# Patient Record
(demographics unavailable — no encounter records)

---

## 2017-04-08 NOTE — ERPHSYRPT
- History of Present Illness


Time Seen by Provider: 04/08/17 00:42


Source: family


Exam Limitations: no limitations


Patient Subjective Stated Complaint: sore spots on tongue and inside of mouth,  

babe seems to have pain when she eats and drinks


Triage Nursing Assessment: white spot noted on tongue.  babe playing and a bit 

whiny.


Physician History: 





sore spots on tongue and inside of mouth,  babe seems to have pain when she 

eats and drinks


Presenting Symptoms: runny nose, other (sore tongue)


Allergies/Adverse Reactions: 








No Known Drug Allergies Allergy (Unverified 04/08/17 00:03)


 





Hx Tetanus, Diphtheria Vaccination/Date Given: Yes


Hx Influenza Vaccination/Date Given: Yes


Hx Pneumococcal Vaccination/Date Given: No


Immunizations Up to Date: Yes





- Review of Systems


Constitutional: No Symptoms


Eyes: No Symptoms


Ears, Nose, & Throat: Mouth Pain, Mouth Swelling


Respiratory: No Symptoms


Cardiac: No Symptoms


Abdominal/Gastrointestinal: No Symptoms


Genitourinary Symptoms: No Symptoms


Musculoskeletal: No Symptoms


Skin: No Symptoms





- Past Medical History


Pertinent Past Medical History: No


Other Medical History: tube placement bilaterally scheduled monday at 

[facility]





- Past Surgical History


Past Surgical History: No





- Social History


Smoking Status: Never smoker


Exposure to second hand smoke: Yes


Drug Use: none


Patient Lives Alone: No





- Female History


Hx Last Menstrual Period: n/a





- Nursing Vital Signs


Nursing Vital Signs: 


 Initial Vital Signs











Temperature                    98.5 F


 


Temperature Source             Axillary


 


Pulse Rate                     162


 


Respiratory Rate               36

















- Physical Exam


General Appearance: No apparent distress, active, non-toxic, playing


Head, Eyes, Nose, & Throat Exam: head inspection normal


Ear Exam: bilateral ear: auricle normal, TM normal


Neck Exam: normal inspection


Respiratory Exam: normal breath sounds


Cardiovascular Exam: regular rate/rhythm


Gastrointestinal Exam: soft


Extremities Exam: normal inspection


Oxygen Delivery: Room Air





- Course


Nursing assessment & vital signs reviewed: Yes


Ordered Tests: 


 Active Orders 24 hr











 Category Date Time Status


 


 PO Popsicle STAT Care  04/08/17 00:23 Active


 


 STREP SCREEN-BETA A Stat Lab  04/08/17 00:29 Completed











Lab/Rad Data: 


 Laboratory Results











  04/08/17 Range/Units





  00:29 


 


Streptococcus Screen  POSITIVE  (Negative)  














- Progress


Progress: unchanged


Counseled pt/family regarding: lab results, diagnosis, need for follow-up





- Departure


Time of Disposition: 00:54


Departure Disposition: Home


Clinical Impression: 


 Pharyngitis due to Streptococcus species, Stomatitis and mucositis





Condition: Stable


Critical Care Time: No


Referrals: 


DAPHNIE DAVALOS [Primary Care Provider] - 


Instructions:  Strep Throat


Additional Instructions: 


SORE THROAT





1.  If you are prescribed antibiotics, you should finish the entire 

prescription as directed.


2.  Many sore throats are caused by viruses and antibiotics will not help.


3.  Acetaminophen or Ibuprofen as directed for fever or discomfort.


4.  Cool liquids may help the pain of sore throat.


Prescriptions: 


Amoxicillin 250 mg/5 ml*** [Amoxil 250 mg/5 ml***] 250 mg PO TID #150 bottle